# Patient Record
Sex: MALE | Race: WHITE | Employment: OTHER | URBAN - METROPOLITAN AREA
[De-identification: names, ages, dates, MRNs, and addresses within clinical notes are randomized per-mention and may not be internally consistent; named-entity substitution may affect disease eponyms.]

---

## 2017-12-18 ENCOUNTER — ALLSCRIPTS OFFICE VISIT (OUTPATIENT)
Dept: OTHER | Facility: OTHER | Age: 67
End: 2017-12-18

## 2017-12-19 NOTE — PROGRESS NOTES
Assessment  1  Acute bronchitis (466 0) (J20 9)    Plan  Acute bronchitis    · Azithromycin 250 MG Oral Tablet; Take 2 tablets today, then 1 tablet daily for 4days   · Follow Up if Not Better Evaluation and Treatment  Follow-up  Status: Complete  Done:[id number] 09:59AM   · Drink plenty of fluids ; Status:Complete;   Done: [id number] 09:59AM   · Call [phone] if: Breathing starts to have a wheeze or whistling sound  ;Status:Complete;   Done: [id number] 09:59AM   · Seek Immediate Medical Attention if: ; Status:Complete;   Done: [id number] 09:59AM   · Seek Immediate Medical Attention if: You are feeling short of breath ; Status:Complete;  Done: 97DQK2574 09:59AM   · Seek Immediate Medical Attention if: You have difficulty breathing, or you are short ofbreath more often ; Status:Complete;   Done: 62NBX3405 09:59AM   · Seek Immediate Medical Attention if: You notice that breathing is rapid, more than 40times a minute ; Status:Complete;   Done: 91KQL3118 09:59AM    Chief Complaint  Patient is here today for chest congestion x 4 days, L  Mario/LPN      History of Present Illness  Bronchitis, Acute, Adult (Brief): The patient is being seen for an initial evaluation of acute bronchitis  Symptoms:  productive cough  The patient is currently asymptomatic  No associated symptoms are reported  The patient is not currently being treated for this problem  Pertinent medical history:  no asthma,-- no chronic bronchitis,-- no chronic obstructive pulmonary disease,-- no congestive heart failure,-- no myocardial infarction,-- no stroke,-- no gastroesophageal reflux disease,-- no diabetes mellitus,-- no obesity,-- no environmental allergies,-- no neoplasm,-- no impaired immunity-- and-- no current influenza vaccination  No risk factors have been identified  Review of Systems   Constitutional: no fever or chills, feels well, no tiredness, no recent weight loss or weight gain    ENT: nasal discharge, but-- no complaints of earache, no loss of hearing, no nosebleeds or nasal discharge, no sore throat or hoarseness  Respiratory: as noted in HPI  Active Problems  1  Sinusitis (473 9) (J32 9)    Past Medical History  Active Problems And Past Medical History Reviewed: The active problems and past medical history were reviewed and updated today  Family History  Family History    1  Denied: Family history of mental disorder   2  Denied: Family history of substance abuse  Family History Reviewed: The family history was reviewed and updated today  Social History   · Never a smoker  The social history was reviewed and updated today  Surgical History  Surgical History Reviewed: The surgical history was reviewed and updated today  Current Meds   1  Fluticasone Propionate 50 MCG/ACT Nasal Suspension; Two sprays in each nostril once daily; Therapy: [id number] to (Last Rx:[id number])  Requested for: [id number] Ordered    The medication list was reviewed and updated today  Allergies  1  No Known Drug Allergies    Vitals   Recorded: 45EVP4268 09:41AM   Temperature 98 3 F, Temporal   Heart Rate 78, R Radial   Pulse Quality Normal, R Radial   Respiration Quality Normal   Respiration 20   Systolic 821, LUE, Sitting   Diastolic 74, LUE, Sitting   Height 5 ft 6 in   Weight 168 lb    BMI Calculated 27 12   BSA Calculated 1 86   O2 Saturation 95     Physical Exam   Constitutional  General appearance: No acute distress, well appearing and well nourished  Ears, Nose, Mouth, and Throat  External inspection of ears and nose: Normal    Otoscopic examination: Tympanic membrance translucent with normal light reflex  Canals patent without erythema  Nasal mucosa, septum, and turbinates: Normal without edema or erythema  Oropharynx: Normal with no erythema, edema, exudate or lesions  Pulmonary  Respiratory effort: No increased work of breathing or signs of respiratory distress  Auscultation of lungs: Abnormal  -- Few rhonchi  Cardiovascular  Auscultation of heart: Normal rate and rhythm, normal S1 and S2, without murmurs  Results/Data  PHQ-2 Adult Depression Screening [id number] 09:56AM User, [email or web address]     Test Name Result Flag Reference   PHQ-2 Adult Depression Score 0     Over the last two weeks, how often have you been bothered by any of the following problems?  Little interest or pleasure in doing things: Not at all - 0 Feeling down, depressed, or hopeless: Not at all - 0   PHQ-2 Adult Depression Screening Negative       Falls Risk Assessment (Dx Z13 89 Screen for Neurologic Disorder) [id number] 09:55AM User, [email or web address]     Test Name Result Flag Reference   Falls Risk      No falls in the past year       Signatures   Electronically signed by : Charlie Kern MD; Dec 18 2017 10:01AM EST                       (Author)

## 2018-01-23 VITALS
BODY MASS INDEX: 27 KG/M2 | OXYGEN SATURATION: 95 % | RESPIRATION RATE: 20 BRPM | HEART RATE: 78 BPM | SYSTOLIC BLOOD PRESSURE: 148 MMHG | DIASTOLIC BLOOD PRESSURE: 74 MMHG | HEIGHT: 66 IN | TEMPERATURE: 98.3 F | WEIGHT: 168 LBS

## 2020-04-02 ENCOUNTER — TELEMEDICINE (OUTPATIENT)
Dept: FAMILY MEDICINE CLINIC | Facility: CLINIC | Age: 70
End: 2020-04-02
Payer: MEDICARE

## 2020-04-02 DIAGNOSIS — Z20.828 EXPOSURE TO SARS-ASSOCIATED CORONAVIRUS: Primary | ICD-10-CM

## 2020-04-02 DIAGNOSIS — R05.9 COUGH: ICD-10-CM

## 2020-04-02 PROCEDURE — 99213 OFFICE O/P EST LOW 20 MIN: CPT | Performed by: FAMILY MEDICINE

## 2020-04-02 RX ORDER — FLUTICASONE PROPIONATE 50 MCG
2 SPRAY, SUSPENSION (ML) NASAL DAILY
COMMUNITY
Start: 2016-12-24

## 2020-04-07 ENCOUNTER — TELEPHONE (OUTPATIENT)
Dept: FAMILY MEDICINE CLINIC | Facility: CLINIC | Age: 70
End: 2020-04-07

## 2020-04-07 DIAGNOSIS — R05.9 COUGH: Primary | ICD-10-CM

## 2020-04-07 RX ORDER — AZITHROMYCIN 250 MG/1
TABLET, FILM COATED ORAL
Qty: 6 TABLET | Refills: 0 | Status: SHIPPED | OUTPATIENT
Start: 2020-04-07 | End: 2020-04-11